# Patient Record
Sex: MALE | Race: BLACK OR AFRICAN AMERICAN | NOT HISPANIC OR LATINO | URBAN - METROPOLITAN AREA
[De-identification: names, ages, dates, MRNs, and addresses within clinical notes are randomized per-mention and may not be internally consistent; named-entity substitution may affect disease eponyms.]

---

## 2021-07-12 ENCOUNTER — OFFICE VISIT (OUTPATIENT)
Dept: FAMILY MEDICINE | Facility: CLINIC | Age: 66
End: 2021-07-12
Payer: COMMERCIAL

## 2021-07-12 ENCOUNTER — APPOINTMENT (OUTPATIENT)
Dept: INTERPRETER SERVICES | Facility: CLINIC | Age: 66
End: 2021-07-12
Payer: COMMERCIAL

## 2021-07-12 VITALS
WEIGHT: 174.6 LBS | TEMPERATURE: 97.8 F | OXYGEN SATURATION: 98 % | HEIGHT: 68 IN | HEART RATE: 60 BPM | RESPIRATION RATE: 14 BRPM | SYSTOLIC BLOOD PRESSURE: 132 MMHG | DIASTOLIC BLOOD PRESSURE: 68 MMHG | BODY MASS INDEX: 26.46 KG/M2

## 2021-07-12 DIAGNOSIS — R05.9 COUGH: ICD-10-CM

## 2021-07-12 DIAGNOSIS — B02.9 HERPES ZOSTER WITHOUT COMPLICATION: Primary | ICD-10-CM

## 2021-07-12 PROCEDURE — 36415 COLL VENOUS BLD VENIPUNCTURE: CPT | Performed by: PHYSICIAN ASSISTANT

## 2021-07-12 PROCEDURE — 86787 VARICELLA-ZOSTER ANTIBODY: CPT | Performed by: PHYSICIAN ASSISTANT

## 2021-07-12 PROCEDURE — 99203 OFFICE O/P NEW LOW 30 MIN: CPT | Performed by: PHYSICIAN ASSISTANT

## 2021-07-12 RX ORDER — VALACYCLOVIR HYDROCHLORIDE 1 G/1
1000 TABLET, FILM COATED ORAL 3 TIMES DAILY
Qty: 21 TABLET | Refills: 0 | Status: SHIPPED | OUTPATIENT
Start: 2021-07-12 | End: 2022-10-04

## 2021-07-12 RX ORDER — FLUTICASONE PROPIONATE 50 MCG
1 SPRAY, SUSPENSION (ML) NASAL DAILY
Qty: 18.2 ML | Refills: 0 | Status: SHIPPED | OUTPATIENT
Start: 2021-07-12 | End: 2022-10-04

## 2021-07-12 ASSESSMENT — MIFFLIN-ST. JEOR: SCORE: 1543.86

## 2021-07-12 NOTE — PATIENT INSTRUCTIONS
Yenny Morrison,    Thank you for allowing [facility] to manage your care.    Your exam is consistent with shingles/Zoster. If you develop worsening/changing symptoms at any time, please call 911 or go to the emergency department for evaluation.      I ordered some lab work, please go to the laboratory to get your studies.    I sent your prescriptions to your pharmacy.    For your pain, please use Ibuprofen 400mg four times daily with food. Between ibuprofen doses, you may use Tylenol 650mg.     Max acetaminophen (Tylenol) 3,000mg/24 hours  Max ibuprofen 2,400mg/24 hours    Drink 8-10 glasses of fluid daily to stay well-hydrated.    Please allow 1-2 business days for our office to contact you in regards to your laboratory/radiological studies.  If not done so, I encourage you to login into [employer] ([email or web address] to review your results as well.     If you have any questions or concerns, please feel free to call us at [phone]    Sincerely,    Hai Erazo PA-C    Did you know?      You can schedule a video visit for follow-up appointments as well as future appointments for certain conditions.  Please see the below link.     [email or web address]    If you have not already done so,  I encourage you to sign up for [employer] ([email or web address]).  This will allow you to review your results, securely communicate with a provider, and schedule virtual visits as well.      Patient Education     Shingles  Shingles is a viral infection caused by the same virus that causes chicken pox. Anyone who has had chicken pox may get shingles later in life. The virus stays in the body, but remains asleep (dormant). Shingles often occurs in older persons or persons with lowered immunity. But it can affect anyone at any age.  Shingles starts as a tingling patch of skin on one side of the body. Small, painful blisters may then appear. The rash rarely  spreads to other parts of the body.  Exposure to shingles can't cause shingles. However, it can cause chicken pox in anyone who has not had chicken pox or has not been vaccinated. The contagious period ends when all blisters have crusted over, generally 1 to 2 weeks after the illness starts.  After the blisters heal, the affected skin may be sensitive or painful for weeks or months, gradually resolving over time. But, sometimes this can last longer and be permanent (called postherpetic neuralgia.)  Shingles vaccines are available. Vaccination can help prevent shingles or make it less painful. It is generally recommended for adults older than 50, even if you've had singles in the past. Talk with your healthcare provider about when to get vaccinated and which vaccine is best for you.  Home care    Medicines may be prescribed to help relieve pain. Take these medicines as directed. Ask your healthcare provider or pharmacist before using over-the-counter medicines for helping treat pain and itching.    In certain cases, antiviral medicines may be prescribed to reduce pain, shorten the illness, and prevent neuralgia. Take these medicines as directed.    Compresses made from a solution of cool water mixed with cornstarch or baking soda may help relieve pain and itching.     Gently wash skin daily with soap and water to help prevent infection. Be certain to rinse off all of the soap, which can be irritating.    Trim fingernails and try not to scratch. Scratching the sores may leave scars.    Stay home from work or school until all blisters have formed a crust and you are no longer contagious.  Follow-up care  Follow up with your healthcare provider, or as directed.  When to seek medical advice    Fever of 100.4 F (38 C) or higher, or as directed by your healthcare provider    Affected skin is on the face or neck and any of the following occur:  ? Headache  ? Eye pain  ? Changes in vision  ? Sores near the eye  ? Weakness of  facial muscles    Blisters occurring on new areas of the body    Pain, redness, or swelling of a joint    Signs of skin infection: colored drainage from the sores, warmth, increasing redness, fever, or increasing pain  Eben last reviewed this educational content on 4/1/2018 2000-2021 [employer]. All rights reserved. This information is not intended as a substitute for professional medical care. Always follow your healthcare professional's instructions.

## 2021-07-12 NOTE — LETTER
[facility] NO[facility]  [address] [place] [address]  [phone]        July 13, 2021      Prince Sheehan  [id number] [place] [address]        Dear ,    We are writing to inform you of your test results.    Your results show that you had chickenpox or Varicella Zoster Virus previously, making it likely that your current rash is due to Zoster or shingles. Please continue with our plan of care.    Resulted Orders   Varicella Zoster Virus Antibody IgG   Result Value Ref Range    VZV [place] IgG Instrument Value 2,235.0 (H) <135.0 Index    Varicella Zoster Antibody IgG Positive (A) No detectable antibody.       Comment:      Suggests previous exposure or immunization and probable immunity.       If you have any questions or concerns, please call the clinic at the number listed above.       Sincerely,      EUGENIO Granados/johnyo

## 2021-07-12 NOTE — PROGRESS NOTES
Assessment & Plan   Problem List Items Addressed This Visit     None      Visit Diagnoses     Herpes zoster without complication    -  Primary    Relevant Medications    valACYclovir (VALTREX) 1000 mg tablet    Other Relevant Orders    Varicella Zoster Virus Antibody IgG (Completed)    Cough        Relevant Medications    fluticasone (FLONASE) 50 MCG/ACT nasal spray         It is my impression based on the historical events and the physical exam that this is zoster, though he denies primary VZV infection. He is interested in serology, which does show Zoster. I do not believe the patient has concurrent, cellulitis, abscess, or necrotizing fasciitis.  Appears well and non-toxic and I have low suspicion for disseminated illness. Will treat with valacyclovir course. Follow up in 7-10 days as needed.    Complete history and physical exam as below. AF with normal VS.    DDx and Dx discussed with and explained to the pt to their satisfaction.  All questions were answered at this time. Pt expressed understanding of and agreement with this dx, tx, and plan. No further workup warranted and standard medication warnings given. I have given the patient a list of pertinent indications for re-evaluation. Will go to the Emergency Department if symptoms worsen or new concerning symptoms arise. Patient left in no apparent distress.     31 minutes spent on the date of the encounter doing chart review, history and exam, documentation and further activities per the note     See Patient Instructions    Return in about 2 weeks (around 7/26/2021) for a recheck of your symptoms if not improving, or call 911/go to an ER anytime if worsening.    EUGENIO Granados  [facility] NOAH Morrison is a 65 year old who presents for the following health issues     HPI   Professional [employer]  on ipad used.  Rash  Onset/Duration: 7-8 days  Description  Location: in back of the right leg, from the knee to  "groin  Character: pimples, red  Itching: moderate  Intensity:  moderate  Progression of Symptoms:  same  Accompanying signs and symptoms:   Fever: no  Body aches or joint pain: no  Sore throat symptoms: YES- once in a while, comes and goes, bloody spit, dryness of throat  Recent cold symptoms: no  History:           Previous episodes of similar rash: None  New exposures:  None  Recent travel: no  Exposure to similar rash: no  Precipitating or alleviating factors: None  Therapies tried and outcome: Vaseline, OTC pain medication    Concern - Swelling  Onset: 7-8 days  Description: right leg  Intensity: moderate  Progression of Symptoms:  same  Accompanying Signs & Symptoms: painful, tingling kind of itching  Previous history of similar problem: No  Precipitating factors:        Worsened by: No  Alleviating factors:        Improved by: No  Therapies tried and outcome: Vaseline, OTC pain medication    Review of Systems   Constitutional, derm systems are negative, except as otherwise noted.      Objective    /68   Pulse 60   Temp 97.8  F (36.6  C) (Tympanic)   Resp 14   Ht 1.715 m (5' 7.52\")   Wt 79.2 kg (174 lb 9.6 oz)   SpO2 98%   BMI 26.93 kg/m    Body mass index is 26.93 kg/m .  Physical Exam  Vitals and nursing note reviewed.   Constitutional:       General: He is not in acute distress.     Appearance: Normal appearance. He is not diaphoretic.   HENT:      Head: Normocephalic and atraumatic.      Nose: Nose normal.   Eyes:      Conjunctiva/sclera: Conjunctivae normal.   Pulmonary:      Effort: Pulmonary effort is normal. No respiratory distress.   Skin:     General: Skin is dry.      Coloration: Skin is not jaundiced or pale.      Comments: RLE: erythematous vesiculopapular rash to right anterolateral buttock and thigh.   Neurological:      General: No focal deficit present.      Mental Status: He is alert. Mental status is at baseline.   Psychiatric:         Mood and Affect: Mood normal.         " Behavior: Behavior normal.          Results for orders placed or performed in visit on 07/12/21   Varicella Zoster Virus Antibody IgG     Status: Abnormal   Result Value Ref Range    VZV [place] IgG Instrument Value 2,235.0 (H) <135.0 Index    Varicella Zoster Antibody IgG Positive (A) No detectable antibody.

## 2021-07-13 LAB
VZV IGG SER QL IA: 2235 INDEX
VZV IGG SER QL IA: POSITIVE

## 2021-10-13 ENCOUNTER — VIRTUAL VISIT (OUTPATIENT)
Dept: FAMILY MEDICINE | Facility: CLINIC | Age: 66
End: 2021-10-13
Payer: COMMERCIAL

## 2021-10-13 ENCOUNTER — TELEPHONE (OUTPATIENT)
Dept: FAMILY MEDICINE | Facility: CLINIC | Age: 66
End: 2021-10-13

## 2021-10-13 DIAGNOSIS — J40 BRONCHITIS: Primary | ICD-10-CM

## 2021-10-13 DIAGNOSIS — J02.9 SORETHROAT: ICD-10-CM

## 2021-10-13 PROCEDURE — 99213 OFFICE O/P EST LOW 20 MIN: CPT | Mod: 95 | Performed by: INTERNAL MEDICINE

## 2021-10-13 RX ORDER — BENZONATATE 100 MG/1
100 CAPSULE ORAL 3 TIMES DAILY PRN
Qty: 21 CAPSULE | Refills: 0 | Status: SHIPPED | OUTPATIENT
Start: 2021-10-13 | End: 2022-10-04

## 2021-10-13 RX ORDER — GUAIFENESIN/DEXTROMETHORPHAN 100-10MG/5
10 SYRUP ORAL EVERY 4 HOURS PRN
Qty: 236 ML | Refills: 0 | Status: SHIPPED | OUTPATIENT
Start: 2021-10-13 | End: 2022-10-04

## 2021-10-13 NOTE — TELEPHONE ENCOUNTER
Patient's sister in law returning call for patient's appointment today.   She wants to interpret for patient and wants to be called at the number above.     Jennifer VNETURAN, RN

## 2021-10-13 NOTE — PATIENT INSTRUCTIONS
Patient Education     Bronchitis, Antibiotic Treatment (Adult)    Bronchitis is an infection of the air passages (bronchial tubes) in your lungs. It often occurs when you have a cold. This illness is contagious during the first few days and is spread through the air by coughing and sneezing, or by direct contact (touching the sick person and then touching your own eyes, nose, or mouth).  Symptoms of bronchitis include cough with mucus (phlegm) and low-grade fever. Bronchitis usually lasts 7 to 14 days. Mild cases can be treated with simple home remedies. More severe infection is treated with an antibiotic.  Home care  Follow these guidelines when caring for yourself at home:    If your symptoms are severe, rest at home for the first 2 to 3 days. When you go back to your usual activities, don't let yourself get too tired.    Don't smoke. Also stay away from secondhand smoke.    You may use over-the-counter medicines to control fever or pain, unless another medicine was prescribed. If you have chronic liver or kidney disease or have ever had a stomach ulcer or gastrointestinal bleeding, talk with your healthcare provider before using these medicines. Also talk to your provider if you are taking medicine to prevent blood clots. Aspirin should never be given to anyone younger than 18 who is ill with a viral infection or fever. It may cause severe liver or brain damage.    Your appetite may be low, so a light diet is fine. Stay well hydrated by drinking 6 to 8 glasses of fluids per day. This includes water, soft drinks, sports drinks, juices, tea, or soup. Extra fluids will help loosen mucus in your nose and lungs.    Over-the-counter cough, cold, and sore-throat medicines will not shorten the length of the illness, but they may be helpful to reduce your symptoms. Don't use decongestants if you have high blood pressure.    Finish all antibiotic medicine. Do this even if you are feeling better after only a few  days.  Follow-up care  Follow up with your healthcare provider, or as advised. If you had an X-ray or ECG (electrocardiogram), a specialist will review it. You will be told of any new test results that may affect your care.  If you are age 65 or older, if you smoke, or if you have a chronic lung disease or condition that affects your immune system, ask your healthcare provider about getting a pneumococcal vaccine and a yearly flu shot (influenza vaccine).  When to seek medical advice  Call your healthcare provider right away if any of these occur:    Fever of 100.4 F (38 C) or higher, or as directed by your healthcare provider    Coughing up more sputum    Weakness, drowsiness, headache, facial pain, ear pain, or a stiff neck  Call 911  Call 911 if any of these occur.    Coughing up blood    Weakness, drowsiness, headache, or stiff neck that get worse    Trouble breathing, wheezing, or pain with breathing  Eben last reviewed this educational content on 6/1/2018 2000-2021 [employer]. All rights reserved. This information is not intended as a substitute for professional medical care. Always follow your healthcare professional's instructions.           Patient Education     When You Have a Sore Throat  A sore throat can be painful. There are many reasons why you may have a sore throat. Your healthcare provider will work with you to find the cause of your sore throat. He or she will also find the best treatment for you.     What causes a sore throat?  Sore throats can be caused or worsened by:     Cold or flu viruses    Bacteria    Irritants such as tobacco smoke or air pollution    Acid reflux  A healthy throat  The tonsils are on the sides of the throat near the base of the tongue. They collect viruses and bacteria and help fight infection. The throat (pharynx) is the passage for air. Mucus from the nasal cavity also moves down the passage.   An inflamed throat  The tonsils and pharynx can become  inflamed due to a cold or flu virus. Postnasal drip (excess mucus draining from the nasal cavity) can irritate the throat. It can also make the throat or tonsils more likely to be infected by bacteria. Severe, untreated tonsillitis in children or adults can cause a pocket of pus (abscess) to form near the tonsil.   Your evaluation  A health evaluation can help find the cause of your sore throat. It can also help your healthcare provider choose the best treatment for you. The evaluation may include a health history, physical exam, and diagnostic tests.   Health history  Your healthcare provider may ask you the following:     How long has the sore throat lasted and how have you been treating it?    Do you have any other symptoms, such as body aches, fever, or cough?    Does your sore throat recur? If so, how often? How many days of school or work have you missed because of a sore throat?    Do you have trouble eating or swallowing?    Have you been told that you snore or have other sleep problems?    Do you have bad breath?    Do you cough up bad-tasting mucus?  Physical exam  During the exam, your healthcare provider checks your ears, nose, and throat for problems. He or she also checks for swelling in the neck, and may listen to your chest.   Possible tests  Other tests your healthcare provider may perform include:     A throat swab to check for bacteria such as streptococcus (the bacteria that causes strep throat)    A blood test to check for mononucleosis (a viral infection)    A chest X-ray to rule out pneumonia, especially if you have a cough  Treating a sore throat  Treatment depends on many factors. What is the likely cause? Is the problem recent? Does it keep coming back? In many cases, the best thing to do is to treat the symptoms, rest, and let the problem heal itself. Antibiotics may help clear up some bacterial infections. For cases of severe or recurring tonsillitis, the tonsils may need to be removed.    Relieving your symptoms    Don t smoke, and stay away from secondhand smoke.    For children, try throat sprays or frozen ice pops. Adults and older children may try lozenges.    Drink warm liquids to soothe the throat and help thin mucus. Stay away from alcohol, spicy foods, and acidic drinks such as orange juice. These can irritate the throat.    Gargle with warm saltwater ( 1 teaspoon of salt to  8 ounces of warm water).    Use a humidifier to keep air moist and relieve throat dryness.    Try over-the-counter pain relievers such as acetaminophen or ibuprofen. Use as directed, and don t exceed the recommended dose. Don t give aspirin to children under age17.    Are antibiotics needed?  If your sore throat is due to a bacterial infection, antibiotics may speed healing and prevent complications. Although group A streptococcus (strep throat) is the major treatable infection for a sore throat, strep throat causes only 5% to 15% of sore throats in adults who seek medical care. Most sore throats are caused by cold or flu viruses. And antibiotics don t treat viral illness. In fact, using antibiotics when they re not needed may lead to bacteria that are harder to kill. Your healthcare provider will prescribe antibiotics only if he or she thinks they are likely to help.   If antibiotics are prescribed  Take the medicine exactly as directed. Be sure to finish your prescription even if you re feeling better. Ask your healthcare provider or pharmacist what side effects are common and what to do about them.   Is surgery needed?  In some cases, tonsils need to be removed. This is often done as outpatient (same-day) surgery. Your healthcare provider may advise removing the tonsils in cases of:     Several severe bouts of tonsillitis in a year.  Severe  episodes include those that lead to missed days of school or work, or that need to be treated with antibiotics.    Tonsillitis that causes breathing problems during  sleep    Tonsillitis caused by food particles collecting in pouches in the tonsils (cryptic tonsillitis)  When to call your healthcare provider   Call your healthcare provider immediately if any of the following occur:     Problems swallowing    Symptoms worsen, or new symptoms develop.    Swollen tonsils make breathing difficult.    The pain is severe enough to keep you from drinking liquids.    If a skin rash or hives, develops, call your healthcare provider immediately. Any of these could signal an allergic reaction to antibiotics.    Symptoms don t improve within a week.    Symptoms don t improve within  2 to 3  days of starting antibiotics.  Call 911  Call 911 if any of the following occur:     Trouble breathing or problems catching your breath may be a medical emergency.    Skin is blue, purple or gray in color    Trouble talking    Feeling dizzy or faint    Feeling of doom  Eben last reviewed this educational content on 7/1/2019 2000-2021 [employer]. All rights reserved. This information is not intended as a substitute for professional medical care. Always follow your healthcare professional's instructions.

## 2021-10-13 NOTE — PROGRESS NOTES
"Prince is a 65 year old who is being evaluated via a billable telephone visit.      What phone number would you like to be contacted at? [phone]  How would you like to obtain your AVS? Mail a copy    Assessment & Plan     Bronchitis  Started off after having a sore throat  Initially started as sore throat  Cough is mainly dry but started getting some yellow sputum in the last couple of days  He coughs a lot when the throat becomes dry  No fever  No body aches  - amoxicillin-clavulanate (AUGMENTIN) 875-125 MG tablet; Take 1 tablet by mouth 2 times daily  - guaiFENesin-dextromethorphan (ROBITUSSIN DM) 100-10 MG/5ML syrup; Take 10 mLs by mouth every 4 hours as needed for cough  - benzonatate (TESSALON) 100 MG capsule; Take 1 capsule (100 mg) by mouth 3 times daily as needed for cough    Sorethroat  Initially started having sore throat a week ago  Sore throat is still persisting  Hurts when he swallows  Glands are swollen  No rhinorrhea  - Symptomatic COVID-19 Virus (Coronavirus) by PCR Nose; Future      20 minutes spent on the date of the encounter doing chart review, history and exam, documentation and further activities per the note       BMI:   Estimated body mass index is 26.93 kg/m  as calculated from the following:    Height as of 7/12/21: 1.715 m (5' 7.52\").    Weight as of 7/12/21: 79.2 kg (174 lb 9.6 oz).           Return in about 1 week (around 10/20/2021), or if symptoms worsen or fail to improve.    Jez Cruz MD  [facility]   Prince is a 65 year old who presents for the following health issues     HPI   Spoke to patient with the help of   One of the relatives acted as   He started having sore throat a week ago  Throat is still sore  Hurts when he swallows  Now having dry cough mainly but in the last 2 days producing some yellow sputum  No shortness of breath  No myalgias  Fully vaccinated against Covid          Review of Systems "   Constitutional, HEENT, cardiovascular, pulmonary, gi and gu systems are negative, except as otherwise noted.      Objective           Vitals:  No vitals were obtained today due to virtual visit.    Physical Exam   healthy, alert and no distress  PSYCH: Alert and oriented times 3; coherent speech, normal   rate and volume, able to articulate logical thoughts, able   to abstract reason, no tangential thoughts, no hallucinations   or delusions  His affect is normal  RESP: Having cough  Remainder of exam unable to be completed due to telephone visits                Phone call duration: 12 minutes

## 2021-10-14 ENCOUNTER — APPOINTMENT (OUTPATIENT)
Dept: INTERPRETER SERVICES | Facility: CLINIC | Age: 66
End: 2021-10-14
Payer: COMMERCIAL

## 2022-02-14 ENCOUNTER — APPOINTMENT (OUTPATIENT)
Dept: INTERPRETER SERVICES | Facility: CLINIC | Age: 67
End: 2022-02-14
Payer: COMMERCIAL

## 2022-10-03 ENCOUNTER — TELEPHONE (OUTPATIENT)
Dept: FAMILY MEDICINE | Facility: CLINIC | Age: 67
End: 2022-10-03

## 2022-10-03 ENCOUNTER — NURSE TRIAGE (OUTPATIENT)
Dept: FAMILY MEDICINE | Facility: CLINIC | Age: 67
End: 2022-10-03

## 2022-10-03 NOTE — TELEPHONE ENCOUNTER
I had spoken to this patient and his relative earlier today, see triage encounter.   Their plan was to go to [facility] as we had not openings and patient not established with us.    Requires [employer] .    I called   Services, placed call to patient with assistance of [facility] .    Sounds like there's more than one person on the phone.    I don't find a consent to communicate on file.    Spoke to patient with assistance of .    Patient did go to urgent care per his report but says he was told to get seen at his clinic as the  did not have x-ray and he was told he needed an x-ray.    Female in the background says they were told there were no openings until January.   I advised I could look but did not think I could get him in for at least a few days, he is not established with us so can't use approval req spot.    Female says they called and [facility] has X-ray so patient plans to go there after work, he is heading for work now.    I advised another option is to call early in the AM to try to get a same day visit with someone.    Patient verbalized understanding of and agreement with plan.    Rosa Owens, RN  [facility]

## 2022-10-03 NOTE — TELEPHONE ENCOUNTER
Reason for Call:  Other appointment    Detailed comments: Patient is coughing up blood since yesterday and would like to be seen in [place] clinic tomorrow.    Phone Number Patient can be reached at: Cell number on file:    Telephone Information:   Mobile [phone]       Best Time: Anytime    Can we leave a detailed message on this number? YES    Call taken on 10/3/2022 at 2:25 PM by Renee Arellano

## 2022-10-03 NOTE — TELEPHONE ENCOUNTER
Relative calling, says Prince is with him    Aubree is driving with Prince to come to the [facility] as patient is seeing blood in sputum starting last night.   Ejmi interpreting for patient, see triage below.    GO TO OFFICE NOW:  * You need to be examined. Come into the office right now.  No openings at [place], patient is not established here, last visit was in July 2021 with same day provider.  * IF NO AVAILABLE APPOINTMENTS:  You need to be seen in an Urgent Care Center. Go to the one at [facility]. Leave now. A nearby Urgent Care Center is often a good source of care. Another choice is to go to the Emergency Department.  Patient and relative verbalized understanding and agreement.    Rosa Owens RN  [facility]    Reason for Disposition    Coughed up blood and > 1 tablespoon (15 ml)  (Exception: Blood-tinged sputum.)    Additional Information    Negative: SEVERE difficulty breathing (e.g., struggling for each breath, speaks in single words)    Negative: Chest pain and difficulty breathing    Negative: Bluish (or gray) lips or face now    Negative: Passed out (i.e., lost consciousness, collapsed and was not responding)    Negative: Shock suspected (e.g., cold/pale/clammy skin, too weak to stand, low BP, rapid pulse)    Negative: Difficult to awaken or acting confused (e.g., disoriented, slurred speech)    Negative: Recent chest injury (i.e., past 24 hours)    Negative: Coughed up blood and large amount (Such as: 'a half cup of blood')    Negative: Sounds like a life-threatening emergency to the triager    Negative: MODERATE difficulty breathing (e.g., speaks in phrases, SOB even at rest, pulse 100-120) and still present when not coughing    Negative: Chest pain    Negative: Unclear to triager if the patient is coughing up blood or vomiting blood    Negative: History of prior 'blood clot' in leg or lungs (i.e., deep vein thrombosis, pulmonary embolism)    Negative:  "History of inherited increased risk of blood clots (e.g., Factor 5 Leiden, Anti-thrombin 3, Protein C or Protein S deficiency, Prothrombin mutation)    Negative: Pregnant or postpartum (from 0 to 6 weeks after delivery)    Negative: Hip or leg fracture (broken bone) in past month (or had cast on leg or ankle in past month)    Negative: Long-distance travel in past month (e.g., car, bus, train, plane; with trip lasting 6 or more hours)    Negative: Bedridden (e.g., nursing home patient, CVA, chronic illness, recovering from surgery)    Negative: Patient sounds very sick or weak to the triager    Negative: MILD difficulty breathing (e.g., minimal/no SOB at rest, SOB with walking, pulse <100) and still present when not coughing (Exception: No change from usual, chronic shortness of breath.)    Answer Assessment - Initial Assessment Questions  1. ONSET: \"When did the cough begin?\"       Has mild chronic cough, worse yesterday  2. SEVERITY: \"How bad is the cough today?\" \"Did the blood appear after a coughing spell?\"       Worse today, says needs to cough every 5-6 minutes  3. SPUTUM: \"Describe the color of your sputum\" (none, dry cough; clear, white, yellow, green)      clear  4. HEMOPTYSIS: \"How much blood?\" (flecks, streaks, tablespoons, etc.)      Starting last night, notes dark brown blood, reports spoonful amount of dark blood   5. DIFFICULTY BREATHING: \"Are you having difficulty breathing?\" If Yes, ask: \"How bad is it?\" (e.g., mild, moderate, severe)     - MILD: No SOB at rest, mild SOB with walking, speaks normally in sentences, can lie down, no retractions, pulse < 100.     - MODERATE: SOB at rest, SOB with minimal exertion and prefers to sit, cannot lie down flat, speaks in phrases, mild retractions, audible wheezing, pulse 100-120.     - SEVERE: Very SOB at rest, speaks in single words, struggling to breathe, sitting hunched forward, retractions, pulse > 120       mild  6. FEVER: \"Do you have a fever?\" If Yes, " "ask: \"What is your temperature, how was it measured, and when did it start?\"      no  7. CARDIAC HISTORY: \"Do you have any history of heart disease?\" (e.g., heart attack, congestive heart failure)       no  8. LUNG HISTORY: \"Do you have any history of lung disease?\"  (e.g., pulmonary embolus, asthma, emphysema)      no  9. PE RISK FACTORS: \"Do you have a history of blood clots?\" (or: recent major surgery, recent prolonged travel, bedridden)      no  10. OTHER SYMPTOMS: \"Do you have any other symptoms?\" (e.g., runny nose, wheezing, chest pain)        no  11. PREGNANCY: \"Is there any chance you are pregnant?\" \"When was your last menstrual period?\"        n/a  12. TRAVEL: \"Have you traveled out of the country in the last month?\" (e.g., travel history, exposures)        no    Protocols used: COUGHING UP BLOOD-A-OH      "

## 2022-10-04 ENCOUNTER — ANCILLARY PROCEDURE (OUTPATIENT)
Dept: GENERAL RADIOLOGY | Facility: CLINIC | Age: 67
End: 2022-10-04
Attending: INTERNAL MEDICINE
Payer: COMMERCIAL

## 2022-10-04 ENCOUNTER — OFFICE VISIT (OUTPATIENT)
Dept: URGENT CARE | Facility: URGENT CARE | Age: 67
End: 2022-10-04

## 2022-10-04 VITALS
HEART RATE: 54 BPM | OXYGEN SATURATION: 100 % | SYSTOLIC BLOOD PRESSURE: 143 MMHG | DIASTOLIC BLOOD PRESSURE: 66 MMHG | WEIGHT: 163.4 LBS | BODY MASS INDEX: 25.2 KG/M2 | TEMPERATURE: 96.8 F

## 2022-10-04 DIAGNOSIS — R04.2 BLOOD IN SPUTUM: Primary | ICD-10-CM

## 2022-10-04 DIAGNOSIS — R04.2 BLOOD IN SPUTUM: ICD-10-CM

## 2022-10-04 LAB
BASOPHILS # BLD AUTO: 0 10E3/UL (ref 0–0.2)
BASOPHILS NFR BLD AUTO: 0 %
EOSINOPHIL # BLD AUTO: 0 10E3/UL (ref 0–0.7)
EOSINOPHIL NFR BLD AUTO: 1 %
ERYTHROCYTE [DISTWIDTH] IN BLOOD BY AUTOMATED COUNT: 12.1 % (ref 10–15)
HCT VFR BLD AUTO: 43.7 % (ref 40–53)
HGB BLD-MCNC: 14.8 G/DL (ref 13.3–17.7)
LYMPHOCYTES # BLD AUTO: 1.8 10E3/UL (ref 0.8–5.3)
LYMPHOCYTES NFR BLD AUTO: 44 %
MCH RBC QN AUTO: 31.3 PG (ref 26.5–33)
MCHC RBC AUTO-ENTMCNC: 33.9 G/DL (ref 31.5–36.5)
MCV RBC AUTO: 92 FL (ref 78–100)
MONOCYTES # BLD AUTO: 0.5 10E3/UL (ref 0–1.3)
MONOCYTES NFR BLD AUTO: 13 %
NEUTROPHILS # BLD AUTO: 1.7 10E3/UL (ref 1.6–8.3)
NEUTROPHILS NFR BLD AUTO: 43 %
PLATELET # BLD AUTO: 243 10E3/UL (ref 150–450)
RBC # BLD AUTO: 4.73 10E6/UL (ref 4.4–5.9)
WBC # BLD AUTO: 4.1 10E3/UL (ref 4–11)

## 2022-10-04 PROCEDURE — 86481 TB AG RESPONSE T-CELL SUSP: CPT | Performed by: INTERNAL MEDICINE

## 2022-10-04 PROCEDURE — 71046 X-RAY EXAM CHEST 2 VIEWS: CPT | Mod: TC | Performed by: RADIOLOGY

## 2022-10-04 PROCEDURE — 36415 COLL VENOUS BLD VENIPUNCTURE: CPT | Performed by: INTERNAL MEDICINE

## 2022-10-04 PROCEDURE — 85025 COMPLETE CBC W/AUTO DIFF WBC: CPT | Performed by: INTERNAL MEDICINE

## 2022-10-04 PROCEDURE — 99214 OFFICE O/P EST MOD 30 MIN: CPT | Performed by: INTERNAL MEDICINE

## 2022-10-04 PROCEDURE — 80053 COMPREHEN METABOLIC PANEL: CPT | Performed by: INTERNAL MEDICINE

## 2022-10-04 NOTE — PROGRESS NOTES
SUBJECTIVE:  Prince Sheehan is an 66 year old male who presents for sometimes has blood come up when clears throat, in the day is mixed with sputum; at night has had just blood come up.  Not coughing, more of throat clearing.  He denies having a cough.  No stomach pain. No heartburn or reflux sxs.  No fevers, chills, sweats.  No shortness of breath.  No chest pain.  No runny nose.  No sore throat.  No recent travel.  Pt here with family member who provides some hx and interprets.    PMH:  neg  There is no problem list on file for this patient.    Social History     Socioeconomic History     Marital status: Single     Spouse name: None     Number of children: None     Years of education: None     Highest education level: None   Tobacco Use     Smoking status: Never Smoker     Smokeless tobacco: Never Used   Substance and Sexual Activity     Alcohol use: Not Currently     Drug use: Not Currently     Sexual activity: Not Currently     Family History   Problem Relation Age of Onset     No Known Problems Mother      No Known Problems Father      No Known Problems Brother      No Known Problems Sister        ALLERGIES:  Patient has no known allergies.    No current outpatient medications on file.     No current facility-administered medications for this visit.         ROS:  ROS is done and is negative for general/constitutional, eye, ENT, Respiratory, cardiovascular, GI, , Skin, musculoskeletal except as noted elsewhere.  All other review of systems negative except as noted elsewhere.      OBJECTIVE:  BP (!) 143/66   Pulse 54   Temp 96.8  F (36  C) (Tympanic)   Wt 74.1 kg (163 lb 6.4 oz)   SpO2 100%   BMI 25.20 kg/m    GENERAL APPEARANCE: Alert, in no acute distress  EYES: normal  EARS: External ears normal. Canals clear. TM's normal.  NOSE:normal  OROPHARYNX:normal  NECK:No adenopathy,masses or thyromegaly  RESP: normal and clear to auscultation  CV:regular rate and rhythm and no murmurs, clicks, or  gallops  ABDOMEN: Abdomen soft, non-tender. BS normal. No masses, organomegaly  SKIN: no ulcers, lesions or rash  MUSCULOSKELETAL:Musculoskeletal normal      RESULTS  Results for orders placed or performed in visit on 10/04/22   XR Chest 2 Views     Status: None    Narrative    CHEST TWO VIEWS  10/4/2022 1:19 PM     HISTORY: Blood in sputum.    COMPARISON: Chest x-ray on 9/9/2016.      Impression    IMPRESSION: PA and lateral views of the chest were obtained.  Cardiomediastinal silhouette is within normal limits. Atherosclerotic  vascular calcification of the aortic knob. Lateral left basilar  pulmonary opacity, could represent atelectasis versus infection. No  significant pleural effusion or pneumothorax.    JOVANNY CHO MD         SYSTEM ID:  [id number]   Results for orders placed or performed in visit on 10/04/22   Quantiferon TB Gold Plus     Status: None (In process)    Specimen: Peripheral Blood    Narrative    The following orders were created for panel order Quantiferon TB Gold Plus.  Procedure                               Abnormality         Status                     ---------                               -----------         ------                     Quantiferon TB Gold Plus...[[id number]]                      In process                 Quantiferon TB Gold Plus...[[id number]]                      In process                 Quantiferon TB Gold Plus...[[id number]]                      In process                 Quantiferon TB Gold Plus...[[id number]]                      In process                   Please view results for these tests on the individual orders.   CBC with platelets and differential     Status: None (In process)    Narrative    The following orders were created for panel order CBC with platelets and differential.  Procedure                               Abnormality         Status                     ---------                               -----------         ------                     CBC  with platelets and d...[[id number]]                      In process                   Please view results for these tests on the individual orders.   .      ASSESSMENT/PLAN:    ASSESSMENT / PLAN:  (R04.2) Blood in sputum  (primary encounter diagnosis)  Comment: pt does not currently have any other sxs such as cough, sore throat, or GI sxs.  Possible etiologies include respiratory infection, inflammation or mass, esophageal or throat inflammation, infection, or growth, etc.  Xray with mild basilar opacity which may atelectasis as pt's current sxs are not c/w pneumonia.  Also noted on chart that pt was seen in a different urgent care and advised to go to ER , but no ER visit on record.  Plan: XR Chest 2 Views, Quantiferon TB Gold Plus, CBC        with platelets and differential, Comprehensive         metabolic panel (BMP + Alb, Alk Phos, ALT, AST,        Total. Bili, TP)        Will check TB quantiferon, cbc and metabolic panel.  We also scheduled pt for an appt next week with pcp for further ongoing evaluation as more work up or testing may be needed if labs do not indicate a cause.  I reviewed supportive care, otc meds to use if needed, expected course, and signs of concern.  Follow up with pcp within 1 week(s) or sooner if worsens in any way.  Reviewed red flag symptoms and is to go to the ER if experiences any of these.        See [facility] for orders, medications, letters, patient instructions    Yolette Cervantes M.D.

## 2022-10-05 LAB
ALBUMIN SERPL-MCNC: 3.7 G/DL (ref 3.4–5)
ALP SERPL-CCNC: 72 U/L (ref 40–150)
ALT SERPL W P-5'-P-CCNC: 20 U/L (ref 0–70)
ANION GAP SERPL CALCULATED.3IONS-SCNC: 1 MMOL/L (ref 3–14)
AST SERPL W P-5'-P-CCNC: 15 U/L (ref 0–45)
BILIRUB SERPL-MCNC: 0.4 MG/DL (ref 0.2–1.3)
BUN SERPL-MCNC: 15 MG/DL (ref 7–30)
CALCIUM SERPL-MCNC: 9 MG/DL (ref 8.5–10.1)
CHLORIDE BLD-SCNC: 109 MMOL/L (ref 94–109)
CO2 SERPL-SCNC: 32 MMOL/L (ref 20–32)
CREAT SERPL-MCNC: 1 MG/DL (ref 0.66–1.25)
GFR SERPL CREATININE-BSD FRML MDRD: 83 ML/MIN/1.73M2
GLUCOSE BLD-MCNC: 83 MG/DL (ref 70–99)
POTASSIUM BLD-SCNC: 4.6 MMOL/L (ref 3.4–5.3)
PROT SERPL-MCNC: 7.3 G/DL (ref 6.8–8.8)
SODIUM SERPL-SCNC: 142 MMOL/L (ref 133–144)

## 2022-10-06 ENCOUNTER — TELEPHONE (OUTPATIENT)
Dept: URGENT CARE | Facility: URGENT CARE | Age: 67
End: 2022-10-06

## 2022-10-06 ENCOUNTER — NURSE TRIAGE (OUTPATIENT)
Dept: NURSING | Facility: CLINIC | Age: 67
End: 2022-10-06

## 2022-10-06 ENCOUNTER — E-VISIT (OUTPATIENT)
Dept: FAMILY MEDICINE | Facility: CLINIC | Age: 67
End: 2022-10-06
Payer: COMMERCIAL

## 2022-10-06 DIAGNOSIS — Z53.9 ERRONEOUS ENCOUNTER--DISREGARD: Primary | ICD-10-CM

## 2022-10-06 LAB
GAMMA INTERFERON BACKGROUND BLD IA-ACNC: 1.13 IU/ML
M TB IFN-G BLD-IMP: POSITIVE
M TB IFN-G CD4+ BCKGRND COR BLD-ACNC: 8.87 IU/ML
MITOGEN IGNF BCKGRD COR BLD-ACNC: 1.35 IU/ML
MITOGEN IGNF BCKGRD COR BLD-ACNC: 1.37 IU/ML
QUANTIFERON MITOGEN: 10 IU/ML
QUANTIFERON NIL TUBE: 1.13 IU/ML
QUANTIFERON TB1 TUBE: 2.48 IU/ML
QUANTIFERON TB2 TUBE: 2.5

## 2022-10-06 NOTE — TELEPHONE ENCOUNTER
Sister in law, Kostas, called (verbal consent obtained) stating that Prince tested positive for TB today and is wondering what kind of isolation precautions she should take. I did open her chart to document the encounter.    Ashley Yang RN  [facility] Nurse Advisor   10/6/2022  3:29 PM

## 2022-10-06 NOTE — TELEPHONE ENCOUNTER
Discussed with the patient and patient's son the positive Quantiferon TB Gold lab test.  Informed the patient that they need to schedule an urgent care virtual visit with a provider through [facility] to discuss treatment and follow up.  Informed the patient and son that the patient will need to quarantine until thevirtual visit has been completed.  Advised they can contact the clinic at [phone] with any questions.  A [facility] message was also sent regarding this plan.  Emileemo  on the line the duration of the call.     ROLAND Alegria CNP

## 2022-10-07 ENCOUNTER — TELEPHONE (OUTPATIENT)
Dept: FAMILY MEDICINE | Facility: CLINIC | Age: 67
End: 2022-10-07

## 2022-10-07 ENCOUNTER — VIRTUAL VISIT (OUTPATIENT)
Dept: URGENT CARE | Facility: CLINIC | Age: 67
End: 2022-10-07
Payer: COMMERCIAL

## 2022-10-07 DIAGNOSIS — R76.12 POSITIVE QUANTIFERON-TB GOLD TEST: Primary | ICD-10-CM

## 2022-10-07 PROCEDURE — 99207 PR NO CHARGE LOS: CPT

## 2022-10-07 NOTE — PROGRESS NOTES
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 No fevers or chills or night sweats/fatigue and xray ok.   Plan: Infectious Disease Referral, rifampin (RIFADIN)        300 MG capsule, Hepatic function panel        Will ask ID for help and follow-up. Reveiwed risks and side effects of medication  Repeat lft's in 3 weeks - labs set-up. Expected course and warning signs reviewed. Call/email with questions/concerns.   Let us know asap if more cough/ fevers or chills/ night sweats/fatigue/etc.         Ford Morrison is a 66 year old presenting for the following health issues:  Follow-up +tb blood test and spitting up blood.   Patient seen in UC 10/4 with concerns of blood mixed in saliva.  No cough, fever, chills, night sweats or unexplained weight loss.  CXR done 10/4 with no apical lesions noted or pleural effusion.  No more blood with sputum and cough stopped.   From [place]. No previous treatment for TB or known contacts. No mantoux test.   Had [place] in past. [place].   Here with son - negative TB blood test. Non-smoker.   Family healthy. No tobacco or ALCOHOL. No liver issues.   No chief complaint on file.      History of Present Illness       Reason for visit:  Follw up    He eats 0-1 servings of fruits and vegetables daily.He consumes 0 sweetened beverage(s) daily.He exercises with enough effort to increase his heart rate 30 to 60 minutes per day.  He exercises with enough effort to increase his heart rate 3 or less days per week.   He is taking medications regularly.    Today's PHQ-9         PHQ-9 Total Score: 0    PHQ-9 Q9 Thoughts of better off dead/self-harm past 2 weeks :   Not at all    How difficult have these problems made it for you to do your work, take care of things at home, or get along with other people: Not difficult at all       ED/UC Followup:    Facility:   [place]   Date of visit: 10/4/2022  Reason for visit: blood In sputum  Current Status: improving      Review of Systems         Objective    There were no vitals taken for this visit.  There is no height or weight on file to calculate BMI.   BP (!) 143/73   Pulse 63   Temp 96.9  F (36.1  C) (Tympanic)   Resp 16   Wt 76.2 kg (168 lb)   SpO2 98%   BMI 25.91 kg/m     Physical Exam   GENERAL: healthy, alert and no distress  EYES: Eyes grossly normal to inspection, PERRL and conjunctivae and sclerae normal  HENT: ear canals and TM's normal, nose and mouth without ulcers or lesions  NECK: no adenopathy, no asymmetry, masses, or scars and thyroid normal to palpation  RESP: lungs clear to auscultation - no rales, rhonchi or wheezes  CV: regular rate and rhythm, normal S1 S2, no S3 or S4, no murmur, click or rub, no peripheral edema and peripheral pulses strong  ABDOMEN: soft, nontender, no hepatosplenomegaly, no masses and bowel sounds normal  MS: no gross musculoskeletal defects noted, no edema  SKIN: no suspicious lesions or rashes  PSYCH: mentation appears normal, affect normal/bright

## 2022-10-07 NOTE — TELEPHONE ENCOUNTER
Dr. Rizwana Teresa, provider with [facility] called to clinic today to send message to clarify situation with this patient for .    Patient had an urgent care visit with Dr. Cervantes on 10/4/22 for blood in sputum. No coughing. Had a POSITIVE Quantiferon-TB Gold test result but did have a NEGATIVE chest x-ray, resulting in latent TB diagnosis.    Patient was scheduled for a virtual urgent care visit for today with Dr. Teresa but Dr. Teresa does not feel the virtual visit is needed for today. Patient is already scheduled for a F2F visit at [facility] with Dr. Velazquez on Monday, October 10, at 4:30 pm.   provider who had reviewed lab results on patient, Garret Stewart, BLAYNE had advised for patient to schedule the urgent care virtual visit to discuss plan, Dr. Teresa feeling this is not necessary since patient is being seen in person on Monday. Patient should follow up with primary care.      Routing to Dr. Velazquez to update.      Ailyn Dang RN  [facility]-Primary Care

## 2022-10-07 NOTE — PROGRESS NOTES
CC: + quantiferon-TB 10/4/2022 negative CXR    Patient seen in  10/4 with concerns of blood mixed in saliva.  No cough, fever, chills, night sweats or unexplained weight loss.  CXR done 10/4 with no apical lesions noted or pleural effusion.    Scheduled to see PCP Monday 10/10 for further Follow-up.    Confusion yesterday over evisit sent by patient.  Was given [facility] appt today.    Son is very concerned about risk to his family (wife and small kids) living with patient.    1. Positive QuantiFERON-TB Gold test    Referred to PCP on Monday for further follow up.  Discussed with son that currently recent testing favors the dx of latent TB.    PCP may consider CT chest and sputum for AFB/ROBIN on Monday.  May consider INH treatment for latent TB.    No charge for [facility] today.    Rizwana Teresa MD  [facility]

## 2022-10-10 ENCOUNTER — OFFICE VISIT (OUTPATIENT)
Dept: FAMILY MEDICINE | Facility: CLINIC | Age: 67
End: 2022-10-10
Payer: COMMERCIAL

## 2022-10-10 VITALS
OXYGEN SATURATION: 98 % | SYSTOLIC BLOOD PRESSURE: 143 MMHG | DIASTOLIC BLOOD PRESSURE: 73 MMHG | BODY MASS INDEX: 25.91 KG/M2 | TEMPERATURE: 96.9 F | HEART RATE: 63 BPM | WEIGHT: 168 LBS | RESPIRATION RATE: 16 BRPM

## 2022-10-10 DIAGNOSIS — Z22.7 LATENT TUBERCULOSIS INFECTION: Primary | ICD-10-CM

## 2022-10-10 PROCEDURE — 99213 OFFICE O/P EST LOW 20 MIN: CPT | Performed by: FAMILY MEDICINE

## 2022-10-10 RX ORDER — RIFAMPIN 300 MG/1
600 CAPSULE ORAL DAILY
Qty: 60 CAPSULE | Refills: 2 | Status: SHIPPED | OUTPATIENT
Start: 2022-10-10 | End: 2022-12-09

## 2022-10-10 ASSESSMENT — PAIN SCALES - GENERAL: PAINLEVEL: NO PAIN (0)

## 2022-10-10 ASSESSMENT — PATIENT HEALTH QUESTIONNAIRE - PHQ9
10. IF YOU CHECKED OFF ANY PROBLEMS, HOW DIFFICULT HAVE THESE PROBLEMS MADE IT FOR YOU TO DO YOUR WORK, TAKE CARE OF THINGS AT HOME, OR GET ALONG WITH OTHER PEOPLE: NOT DIFFICULT AT ALL
SUM OF ALL RESPONSES TO PHQ QUESTIONS 1-9: 0
SUM OF ALL RESPONSES TO PHQ QUESTIONS 1-9: 0

## 2022-10-19 NOTE — TELEPHONE ENCOUNTER
Ned Power is a 47 year old male presents to the Urgent Care clinic today with complaints of right sided dental pain for 3 days. Pt has tried Ibuprofen, mouth wash for sx with little relief.      Pt states he has not seen a dentist  Pt believes he might have an infection, states he has \"poor dental care\"    Patient masked during visit. LPN wearing mask, gloves and eye protection during visit.    Patient would like communication of their results via:    [employer]       Patient called in asking for help scheduling urgent care virtual visit and asking what he needs to do in the meantime. Writer noted patient was able to communicate appropriately but did see patient usually prefers  - patient states he would like an  on the phone during our conversation. Rest of conversation with patient was through [facility] . Attempted to help walk patient through scheduling urgent care virtual visit but patient continued having issues with his MyChart. Patient was transferred to scheduling line where they were able to help him schedule this visit. Reiterated to patient importance of quarantining during this time due to positive TB gold lab test. No further questions or concerns.      Becky Smith, LORENZON, RN  [facility]

## 2022-12-09 ENCOUNTER — OFFICE VISIT (OUTPATIENT)
Dept: INFECTIOUS DISEASES | Facility: CLINIC | Age: 67
End: 2022-12-09
Attending: FAMILY MEDICINE
Payer: COMMERCIAL

## 2022-12-09 VITALS
WEIGHT: 168.7 LBS | SYSTOLIC BLOOD PRESSURE: 114 MMHG | BODY MASS INDEX: 26.02 KG/M2 | OXYGEN SATURATION: 99 % | HEART RATE: 52 BPM | DIASTOLIC BLOOD PRESSURE: 65 MMHG

## 2022-12-09 DIAGNOSIS — Z22.7 LATENT TUBERCULOSIS INFECTION: ICD-10-CM

## 2022-12-09 DIAGNOSIS — R04.2 BLOOD-TINGED SPUTUM: ICD-10-CM

## 2022-12-09 LAB
ALBUMIN SERPL-MCNC: 3.7 G/DL (ref 3.4–5)
ALP SERPL-CCNC: 77 U/L (ref 40–150)
ALT SERPL W P-5'-P-CCNC: 22 U/L (ref 0–70)
ANION GAP SERPL CALCULATED.3IONS-SCNC: 4 MMOL/L (ref 3–14)
AST SERPL W P-5'-P-CCNC: 18 U/L (ref 0–45)
BASOPHILS # BLD AUTO: 0 10E3/UL (ref 0–0.2)
BASOPHILS NFR BLD AUTO: 0 %
BILIRUB SERPL-MCNC: 0.6 MG/DL (ref 0.2–1.3)
BUN SERPL-MCNC: 16 MG/DL (ref 7–30)
CALCIUM SERPL-MCNC: 9.1 MG/DL (ref 8.5–10.1)
CHLORIDE BLD-SCNC: 105 MMOL/L (ref 94–109)
CO2 SERPL-SCNC: 30 MMOL/L (ref 20–32)
CREAT SERPL-MCNC: 0.98 MG/DL (ref 0.66–1.25)
DEPRECATED CALCIDIOL+CALCIFEROL SERPL-MC: 49 UG/L (ref 20–75)
EOSINOPHIL # BLD AUTO: 0 10E3/UL (ref 0–0.7)
EOSINOPHIL NFR BLD AUTO: 1 %
ERYTHROCYTE [DISTWIDTH] IN BLOOD BY AUTOMATED COUNT: 12.1 % (ref 10–15)
GFR SERPL CREATININE-BSD FRML MDRD: 85 ML/MIN/1.73M2
GLUCOSE BLD-MCNC: 112 MG/DL (ref 70–99)
HCT VFR BLD AUTO: 41.8 % (ref 40–53)
HGB BLD-MCNC: 14.2 G/DL (ref 13.3–17.7)
IMM GRANULOCYTES # BLD: 0 10E3/UL
IMM GRANULOCYTES NFR BLD: 0 %
LYMPHOCYTES # BLD AUTO: 1.4 10E3/UL (ref 0.8–5.3)
LYMPHOCYTES NFR BLD AUTO: 41 %
MCH RBC QN AUTO: 31.2 PG (ref 26.5–33)
MCHC RBC AUTO-ENTMCNC: 34 G/DL (ref 31.5–36.5)
MCV RBC AUTO: 92 FL (ref 78–100)
MONOCYTES # BLD AUTO: 0.5 10E3/UL (ref 0–1.3)
MONOCYTES NFR BLD AUTO: 13 %
NEUTROPHILS # BLD AUTO: 1.6 10E3/UL (ref 1.6–8.3)
NEUTROPHILS NFR BLD AUTO: 45 %
NRBC # BLD AUTO: 0 10E3/UL
NRBC BLD AUTO-RTO: 0 /100
PLATELET # BLD AUTO: 212 10E3/UL (ref 150–450)
POTASSIUM BLD-SCNC: 4 MMOL/L (ref 3.4–5.3)
PROT SERPL-MCNC: 7.5 G/DL (ref 6.8–8.8)
RBC # BLD AUTO: 4.55 10E6/UL (ref 4.4–5.9)
SODIUM SERPL-SCNC: 139 MMOL/L (ref 133–144)
WBC # BLD AUTO: 3.5 10E3/UL (ref 4–11)

## 2022-12-09 PROCEDURE — 82306 VITAMIN D 25 HYDROXY: CPT | Performed by: INTERNAL MEDICINE

## 2022-12-09 PROCEDURE — 99205 OFFICE O/P NEW HI 60 MIN: CPT | Performed by: INTERNAL MEDICINE

## 2022-12-09 PROCEDURE — 85025 COMPLETE CBC W/AUTO DIFF WBC: CPT | Performed by: INTERNAL MEDICINE

## 2022-12-09 PROCEDURE — 80053 COMPREHEN METABOLIC PANEL: CPT | Performed by: INTERNAL MEDICINE

## 2022-12-09 PROCEDURE — 36415 COLL VENOUS BLD VENIPUNCTURE: CPT | Performed by: INTERNAL MEDICINE

## 2022-12-09 RX ORDER — RIFAMPIN 300 MG/1
600 CAPSULE ORAL DAILY
Qty: 60 CAPSULE | Refills: 1 | Status: SHIPPED | OUTPATIENT
Start: 2022-12-09 | End: 2023-01-08

## 2022-12-09 NOTE — NURSING NOTE
"Prince Sheehan's goals for this visit include:   Chief Complaint   Patient presents with     Consult     Rule out latent TB infection. TB blood work completed on 10/10/22. Referred by Dr. Velazquez       PCP: [facility]    Referring Provider:  Primitivo Velazquez MD  [id number] [place] [address]      Initial /65 (BP Location: Left arm, Patient Position: Sitting, Cuff Size: Adult Regular)   Pulse 52   Wt 76.5 kg (168 lb 11.2 oz)   SpO2 99%   BMI 26.02 kg/m   Estimated body mass index is 26.02 kg/m  as calculated from the following:    Height as of 7/12/21: 1.715 m (5' 7.52\").    Weight as of this encounter: 76.5 kg (168 lb 11.2 oz).    Medication Reconciliation: complete    Do you need any medication refills at today's visit? none    LUCY Olmos  Rheumatology/Infectious disease  [facility]   [phone]    "

## 2022-12-09 NOTE — PROGRESS NOTES
INFECTIOUS DISEASES CONSULTATION  NOTE    Consult requested by: Primitivo Velazquez MD  Reason for Consult : Latent Tb   Date of Consult: 12/8/22    In[facility]     HISTORY OF PRESENT ILLNESS:                                                    Prince Sheehan is a 67 year old male, history of bilateral renal cysts, who presents to clinic today for the following health issues: Latent TB     Prince is here with a relative, Romero. About 2 months ago, he noticed upon awakening some blood thinged sputum for 5 days when he was clearing his throat. He felt better after that and had no recurrence of similar symptoms.  No obvious hemoptysis. No fever, chills, nightsweats, shortness of breath at that time. In fact, he was feeling well otherwise.  He went to seek medical care and CXR on 10/4/22 showed cardiomediastinal silhouette is within normal limits. Atherosclerotic vascular calcification of the aortic knob. Lateral left basilar pulmonary opacity, could represent atelectasis versus infection. No significant pleural effusion or pneumothorax. TB quantiferon was positive. He was started on Rifampin 600 mg po daily  on 10/10/22. He tolerated Rifampin well . He took it for 1 month and last dose was about 1 month ago.      [place] [facility] [place] ago. He had BCG vaccination in his childhood. He denies any contacts with anyone with TB.  He lives with this relative and his family ( 4 kids) . Prince feels well today. No fever, chills, nightsweats, weight loss. He has good appetite and good energy level. no chronic cough. no hemotysis . He works as a  and denies exposure to any chemical at work. he does not smoke or drink.        ROS:  CONSTITUTIONAL:NEGATIVE for fever, chills, change in weight  INTEGUMENTARY/SKIN: NEGATIVE for worrisome rashes, moles or lesions  EYES: NEGATIVE for vision changes or irritation  ENT/MOUTH: NEGATIVE for ear, mouth and throat problems  RESP:NEGATIVE for  significant cough or SOB  CV: NEGATIVE for chest pain, palpitations or peripheral edema  GI: NEGATIVE for nausea, abdominal pain, heartburn, or change in bowel habits  : NEGATIVE for urinary frequency, hematuria or dysuria  MUSCULOSKELETAL: NEGATIVE for significant arthralgias or myalgia  NEURO: NEGATIVE for weakness, dizziness or paresthesias  ENDOCRINE: NEGATIVE for temperature intolerance, skin/hair changes  HEME/ALLERGY/IMMUNE: NEGATIVE for bleeding problems  PSYCHIATRIC: NEGATIVE for changes in mood or affect    Problem list and histories reviewed & adjusted, as indicated.  Additional history: as documented    PAST MEDICAL HISTORY:  Latent TB     PAST SURGICAL HISTORY:  Patient  has no past surgical history on file.    CURRENT MEDICATIONS:  Current Outpatient Medications   Medication Sig Dispense Refill     rifampin (RIFADIN) 300 MG capsule Take 2 capsules (600 mg) by mouth daily 60 capsule 2     ALLERGY:  No Known Allergies    IMMUNIZATION HISTORY:  Immunization History   Administered Date(s) Administered     COVID-19 Vaccine 18+ (Moderna) 02/13/2021, 03/13/2021, 11/18/2021, 07/15/2022     Influenza Vaccine >6 months (Alfuria,Fluzone) 11/01/2017, 11/18/2021     MMR 11/01/2017     Tdap (Adacel,Boostrix) 11/01/2017     Twinrix A/B 11/01/2017, 12/01/2017, 05/02/2018     Varicella 11/01/2017     SOCIAL HISTORY:  Patient  reports that he has never smoked. He has never used smokeless tobacco. He reports that he does not currently use alcohol. He reports that he does not currently use drugs.  born and raised in [facility], came to [place] ago. Lives with a relative with 4 other children. works as a .     FAMILY HISTORY:  Patient's family history includes No Known Problems in his brother, father, mother, and sister.    Labs reviewed in EPIC    OBJECTIVE:                                                    /65 (BP Location: Left arm, Patient Position: Sitting, Cuff Size: Adult Regular)    Pulse 52   Wt 76.5 kg (168 lb 11.2 oz)   SpO2 99%   BMI 26.02 kg/m   Body mass index is 26.02 kg/m .   GENERAL: healthy, alert and no distress  EYES: Eyes grossly normal to inspection, PERRL and conjunctivae and sclerae normal  HENT: ear canals and TM's normal, nose and mouth without ulcers or lesions  NECK: no adenopathy, no asymmetry, masses, or scars and thyroid normal to palpation  RESP: lungs clear to auscultation - no rales, rhonchi or wheezes  CV: regular rate and rhythm, normal S1 S2, no S3 or S4, no murmur, click or rub, no peripheral edema and peripheral pulses strong  ABDOMEN: soft, nontender, no hepatosplenomegaly, no masses and bowel sounds normal  MS: no gross musculoskeletal defects noted, no edema  SKIN: no suspicious lesions or rashes  NEURO: Normal strength and tone, mentation intact and speech normal  Spine : no spinal tenderness  BACK: no CVA tenderness, no paralumbar tenderness  PSYCH: mentation appears normal, affect normal  LYMPH: no cervical, supraclavicular, axillary, or inguinal adenopathy       ASSESSMENT AND PLAN:                                                      1. Latent TB   - negative CXR on 10/4/22  - blood tinged sputum with throat clearing and not coughing per patient. x 5 days ( noticed upon awakening in the morning) , resolved on its own.   - started on Rifampin 600 mg po daily on 10/10/22 but patient took it for 1 month only. perhaps not knowing there was 2 refills.     2. Bilateral renal cysts    Plan/Recommendations  - clinically stable and no signs of active TB. Blood tinged sputum with throat clearing, probably related to injury of mucosal membrane/ dryness but clinical description not consistent with active TB   - labs: CMP, CBC diff, CRP , Vitamin D today  - monthly CMP, CBC diff  - will monitor clinically for now,  if he has any hemoptysis, he is advised to notify his doctors / go to ER and will need CT chest at that time. if recurs, he may need bronchoscopy    - if  labs are ok, will reorder Rifampin 600 mg po daily to complete 4 months of therapy. discussed with them indication of Rifampin , possible side effects of Rifampin.     video follow-up on 1/6/22 . Plan discussed with patient and Romero    Thank You for allowing me to participate in the care of this patient    Ramakrishna Olvera MD, M.Med.Sc  Division of Infectious Diseases and International Medicine  [facility]      Time spent with patient, chart review, documentation and care coordination : 90 min

## 2023-01-06 ENCOUNTER — VIRTUAL VISIT (OUTPATIENT)
Dept: INFECTIOUS DISEASES | Facility: CLINIC | Age: 68
End: 2023-01-06
Payer: COMMERCIAL

## 2023-01-06 ENCOUNTER — LAB (OUTPATIENT)
Dept: LAB | Facility: CLINIC | Age: 68
End: 2023-01-06
Payer: COMMERCIAL

## 2023-01-06 DIAGNOSIS — Z22.7 LATENT TUBERCULOSIS INFECTION: Primary | ICD-10-CM

## 2023-01-06 DIAGNOSIS — Z22.7 LATENT TUBERCULOSIS INFECTION: ICD-10-CM

## 2023-01-06 LAB
ALBUMIN SERPL-MCNC: 3.8 G/DL (ref 3.4–5)
ALP SERPL-CCNC: 90 U/L (ref 40–150)
ALT SERPL W P-5'-P-CCNC: 25 U/L (ref 0–70)
ANION GAP SERPL CALCULATED.3IONS-SCNC: 6 MMOL/L (ref 3–14)
AST SERPL W P-5'-P-CCNC: 23 U/L (ref 0–45)
BASOPHILS # BLD AUTO: 0 10E3/UL (ref 0–0.2)
BASOPHILS NFR BLD AUTO: 0 %
BILIRUB DIRECT SERPL-MCNC: 0.2 MG/DL (ref 0–0.2)
BILIRUB SERPL-MCNC: 0.6 MG/DL (ref 0.2–1.3)
BUN SERPL-MCNC: 15 MG/DL (ref 7–30)
CALCIUM SERPL-MCNC: 9.3 MG/DL (ref 8.5–10.1)
CHLORIDE BLD-SCNC: 104 MMOL/L (ref 94–109)
CO2 SERPL-SCNC: 29 MMOL/L (ref 20–32)
CREAT SERPL-MCNC: 0.86 MG/DL (ref 0.66–1.25)
EOSINOPHIL # BLD AUTO: 0 10E3/UL (ref 0–0.7)
EOSINOPHIL NFR BLD AUTO: 1 %
ERYTHROCYTE [DISTWIDTH] IN BLOOD BY AUTOMATED COUNT: 11.8 % (ref 10–15)
GFR SERPL CREATININE-BSD FRML MDRD: >90 ML/MIN/1.73M2
GLUCOSE BLD-MCNC: 130 MG/DL (ref 70–99)
HCT VFR BLD AUTO: 42.8 % (ref 40–53)
HGB BLD-MCNC: 14.5 G/DL (ref 13.3–17.7)
LYMPHOCYTES # BLD AUTO: 2.2 10E3/UL (ref 0.8–5.3)
LYMPHOCYTES NFR BLD AUTO: 49 %
MCH RBC QN AUTO: 31.5 PG (ref 26.5–33)
MCHC RBC AUTO-ENTMCNC: 33.9 G/DL (ref 31.5–36.5)
MCV RBC AUTO: 93 FL (ref 78–100)
MONOCYTES # BLD AUTO: 0.5 10E3/UL (ref 0–1.3)
MONOCYTES NFR BLD AUTO: 11 %
NEUTROPHILS # BLD AUTO: 1.7 10E3/UL (ref 1.6–8.3)
NEUTROPHILS NFR BLD AUTO: 39 %
PLATELET # BLD AUTO: 230 10E3/UL (ref 150–450)
POTASSIUM BLD-SCNC: 4.6 MMOL/L (ref 3.4–5.3)
PROT SERPL-MCNC: 7.4 G/DL (ref 6.8–8.8)
RBC # BLD AUTO: 4.61 10E6/UL (ref 4.4–5.9)
SODIUM SERPL-SCNC: 139 MMOL/L (ref 133–144)
WBC # BLD AUTO: 4.5 10E3/UL (ref 4–11)

## 2023-01-06 PROCEDURE — 36415 COLL VENOUS BLD VENIPUNCTURE: CPT

## 2023-01-06 PROCEDURE — 80053 COMPREHEN METABOLIC PANEL: CPT

## 2023-01-06 PROCEDURE — 99214 OFFICE O/P EST MOD 30 MIN: CPT | Mod: 95 | Performed by: INTERNAL MEDICINE

## 2023-01-06 PROCEDURE — 85025 COMPLETE CBC W/AUTO DIFF WBC: CPT

## 2023-01-06 PROCEDURE — 82248 BILIRUBIN DIRECT: CPT

## 2023-01-06 NOTE — PROGRESS NOTES
Prince Sheehan is a 67 year old who is being evaluated via a billable video visit.      How would you like to obtain your AVS? Mail a copy  If the video visit is dropped, the invitation should be resent by: Text to cell phone: [phone]  Will anyone else be joining your video visit? Yes: relative. How would they like to receive their invitation? Text to cell phone: [phone]  Are you still currently in the state of [place]? YES  If patient encounters technical issues they should call [phone]  During this virtual visit the patient is located in [place], patient verifies this as the location during the entirety of this visit.   Video-Visit Details  Video Start Time: 11.30 am  Video End Time: 11.36 am   Originating Location (pt. Location): Home  Distant Location (provider location):  [facility]  Platform used for Video Visit: [employer]     INFECTIOUS DISEASES PROGRESS NOTE    Consult requested by: Primitivo Velazquez MD  Reason for Consult : Latent Tb   Date of Consult: 12/8/22    Infectious Diseases Clinic     HISTORY OF PRESENT ILLNESS:                                                    Prince Sheehan is a 67 year old male, history of bilateral renal cysts, who presents to clinic today for the following health issues: Latent TB     Prince is here with a relative, Romero. About 2 months ago, he noticed upon awakening some blood thinged sputum for 5 days when he was clearing his throat. He felt better after that and had no recurrence of similar symptoms.  No obvious hemoptysis. No fever, chills, nightsweats, shortness of breath at that time. In fact, he was feeling well otherwise.  He went to seek medical care and CXR on 10/4/22 showed cardiomediastinal silhouette is within normal limits. Atherosclerotic vascular calcification of the aortic knob. Lateral left basilar pulmonary opacity, could represent atelectasis versus infection. No significant pleural effusion or pneumothorax. TB  quantiferon was positive. He was started on Rifampin 600 mg po daily  on 10/10/22. He tolerated Rifampin well . He took it for 1 month and last dose was about 1 month ago.      [place] from [place] about 7 years ago. He had BCG vaccination in his childhood. He denies any contacts with anyone with TB.  He lives with this relative and his family ( 4 kids) . Prince feels well today. No fever, chills, nightsweats, weight loss. He has good appetite and good energy level. no chronic cough. no hemotysis . He works as a  and denies exposure to any chemical at work. he does not smoke or drink.        ROS:  CONSTITUTIONAL:NEGATIVE for fever, chills, change in weight  INTEGUMENTARY/SKIN: NEGATIVE for worrisome rashes, moles or lesions  EYES: NEGATIVE for vision changes or irritation  ENT/MOUTH: NEGATIVE for ear, mouth and throat problems  RESP:NEGATIVE for significant cough or SOB  CV: NEGATIVE for chest pain, palpitations or peripheral edema  GI: NEGATIVE for nausea, abdominal pain, heartburn, or change in bowel habits  : NEGATIVE for urinary frequency, hematuria or dysuria  MUSCULOSKELETAL: NEGATIVE for significant arthralgias or myalgia  NEURO: NEGATIVE for weakness, dizziness or paresthesias  ENDOCRINE: NEGATIVE for temperature intolerance, skin/hair changes  HEME/ALLERGY/IMMUNE: NEGATIVE for bleeding problems  PSYCHIATRIC: NEGATIVE for changes in mood or affect    Problem list and histories reviewed & adjusted, as indicated.  Additional history: as documented    Video Follow-up on 1/6/23  Prince is doing well. No more cough or bloody sputum. no pain. tolerates Rifampin well. has good appetite with stable weight        PAST MEDICAL HISTORY:  Latent TB     PAST SURGICAL HISTORY:  Patient  has no past surgical history on file.    CURRENT MEDICATIONS:  Current Outpatient Medications   Medication Sig Dispense Refill     rifampin (RIFADIN) 300 MG capsule Take 2 capsules (600 mg) by mouth daily for 30  days 60 capsule 1     ALLERGY:  No Known Allergies    IMMUNIZATION HISTORY:  Immunization History   Administered Date(s) Administered     COVID-19 Vaccine 18+ (Moderna) 02/13/2021, 03/13/2021, 11/18/2021, 07/15/2022     Influenza Vaccine >6 months (Alfuria,Fluzone) 11/01/2017, 11/18/2021     MMR 11/01/2017     Tdap (Adacel,Boostrix) 11/01/2017     Twinrix A/B 11/01/2017, 12/01/2017, 05/02/2018     Varicella 11/01/2017     SOCIAL HISTORY:  Patient  reports that he has never smoked. He has never used smokeless tobacco. He reports that he does not currently use alcohol. He reports that he does not currently use drugs.  born and raised in [facility], [place]. Lives with a relative with 4 other children. works as a .     FAMILY HISTORY:  Patient's family history includes No Known Problems in his brother, father, mother, and sister.    Labs reviewed in EPIC    OBJECTIVE:                                                    There were no vitals taken for this visit. There is no height or weight on file to calculate BMI.   GENERAL: healthy, alert and no distress  EYES: Eyes grossly normal to inspection  NECK: supple  RESP: breathing comfortably on room air. no coughing   NEURO: mentation intact and speech normal  PSYCH: mentation appears normal, affect normal       ASSESSMENT AND PLAN:                                                      1. Latent TB   - negative CXR on 10/4/22  - blood tinged sputum with throat clearing and not coughing per patient. x 5 days ( noticed upon awakening in the morning) , resolved on its own.   - started on Rifampin 600 mg po daily on 10/10/22 but patient took it for 1 month only. perhaps not knowing there was 2 refills.  - restarted Rifampin on 12/9/22      2. Bilateral renal cysts    Plan/Recommendations  - clinically stable and no signs of active TB. no recurrence of chronic cough or bloody sputum.   - labs: CMP, CBC diff today , per request to be done at  [facility]   - if labs are ok, will reorder Rifampin 600 mg po daily to complete 4 months of therapy.    video follow-up on 2/3/23 at 9.30 am.    Plan discussed with patient and Romero Olvera MD, M.Med.Sc  Division of Infectious Diseases and International Medicine  [facility]      Time spent with patient, chart review, documentation and care coordination : 20 min

## 2023-01-08 DIAGNOSIS — Z22.7 LATENT TUBERCULOSIS INFECTION: ICD-10-CM

## 2023-01-08 RX ORDER — RIFAMPIN 300 MG/1
600 CAPSULE ORAL DAILY
Qty: 60 CAPSULE | Refills: 0 | Status: SHIPPED | OUTPATIENT
Start: 2023-01-08

## 2023-01-22 ENCOUNTER — HEALTH MAINTENANCE LETTER (OUTPATIENT)
Age: 68
End: 2023-01-22

## 2023-02-03 ENCOUNTER — VIRTUAL VISIT (OUTPATIENT)
Dept: INFECTIOUS DISEASES | Facility: CLINIC | Age: 68
End: 2023-02-03
Payer: COMMERCIAL

## 2023-02-03 DIAGNOSIS — Z22.7 LATENT TUBERCULOSIS INFECTION: Primary | ICD-10-CM

## 2023-02-03 PROCEDURE — 99213 OFFICE O/P EST LOW 20 MIN: CPT | Mod: 95 | Performed by: INTERNAL MEDICINE

## 2023-02-03 NOTE — PROGRESS NOTES
Prince Sheehan is a 67 year old who is being evaluated via a billable video visit.      How would you like to obtain your AVS? Mail a copy  If the video visit is dropped, the invitation should be resent by: Text to cell phone: [phone]  Will anyone else be joining your video visit? Yes: relative. How would they like to receive their invitation? Text to cell phone: [phone]  Are you still currently in the state of [place]? YES  If patient encounters technical issues they should call [phone]  During this virtual visit the patient is located in [place], patient verifies this as the location during the entirety of this visit.   Video-Visit Details  Video Start Time: 9.44 am  Video End Time:  9.50 am   Originating Location (pt. Location): Home  Distant Location (provider location):  [facility]  Platform used for Video Visit: [employer]     INFECTIOUS DISEASES PROGRESS NOTE    Consult requested by: Primitivo Velazquez MD  Reason for Consult : Latent Tb   Date of Consult: 12/8/22    Infectious Diseases Clinic     HISTORY OF PRESENT ILLNESS:                                                    Prince Sheehan is a 67 year old male, history of bilateral renal cysts, who presents to clinic today for the following health issues: Latent TB     Prince is here with a relative, Romero. About 2 months ago, he noticed upon awakening some blood thinged sputum for 5 days when he was clearing his throat. He felt better after that and had no recurrence of similar symptoms.  No obvious hemoptysis. No fever, chills, nightsweats, shortness of breath at that time. In fact, he was feeling well otherwise.  He went to seek medical care and CXR on 10/4/22 showed cardiomediastinal silhouette is within normal limits. Atherosclerotic vascular calcification of the aortic knob. Lateral left basilar pulmonary opacity, could represent atelectasis versus infection. No significant pleural effusion or pneumothorax. TB  quantiferon was positive. He was started on Rifampin 600 mg po daily  on 10/10/22. He tolerated Rifampin well . He took it for 1 month and last dose was about 1 month ago.      [place] [place] [facility] about 7 years ago. He had BCG vaccination in his childhood. He denies any contacts with anyone with TB.  He lives with this relative and his family ( 4 kids) . Prince feels well today. No fever, chills, nightsweats, weight loss. He has good appetite and good energy level. no chronic cough. no hemotysis . He works as a  and denies exposure to any chemical at work. he does not smoke or drink.        ROS:  CONSTITUTIONAL:NEGATIVE for fever, chills, change in weight  INTEGUMENTARY/SKIN: NEGATIVE for worrisome rashes, moles or lesions  EYES: NEGATIVE for vision changes or irritation  ENT/MOUTH: NEGATIVE for ear, mouth and throat problems  RESP:NEGATIVE for significant cough or SOB  CV: NEGATIVE for chest pain, palpitations or peripheral edema  GI: NEGATIVE for nausea, abdominal pain, heartburn, or change in bowel habits  : NEGATIVE for urinary frequency, hematuria or dysuria  MUSCULOSKELETAL: NEGATIVE for significant arthralgias or myalgia  NEURO: NEGATIVE for weakness, dizziness or paresthesias  ENDOCRINE: NEGATIVE for temperature intolerance, skin/hair changes  HEME/ALLERGY/IMMUNE: NEGATIVE for bleeding problems  PSYCHIATRIC: NEGATIVE for changes in mood or affect    Problem list and histories reviewed & adjusted, as indicated.  Additional history: as documented    Video Follow-up on 1/6/23  Prince is doing well. No more cough or bloody sputum. no pain. tolerates Rifampin well. has good appetite with stable weight      Video follow-up on 2/3/23  Prince is doing well. tolerates Rifampin well. no nausea/vomiting/diarrhea/cough/ hemoptysis and has good appetite     Discussed labs       PAST MEDICAL HISTORY:  Latent TB     PAST SURGICAL HISTORY:  Patient  has no past surgical history on file.    CURRENT  MEDICATIONS:  Current Outpatient Medications   Medication Sig Dispense Refill     rifampin (RIFADIN) 300 MG capsule Take 2 capsules (600 mg) by mouth daily 60 capsule 0     ALLERGY:  No Known Allergies    IMMUNIZATION HISTORY:  Immunization History   Administered Date(s) Administered     COVID-19 Vaccine 18+ (Moderna) 02/13/2021, 03/13/2021, 11/18/2021, 07/15/2022     Influenza Vaccine >6 months (Alfuria,Fluzone) 11/01/2017, 11/18/2021     MMR 11/01/2017     Tdap (Adacel,Boostrix) 11/01/2017     Twinrix A/B 11/01/2017, 12/01/2017, 05/02/2018     Varicella 11/01/2017     SOCIAL HISTORY:  Patient  reports that he has never smoked. He has never used smokeless tobacco. He reports that he does not currently use alcohol. He reports that he does not currently use drugs.  born and raised in [facility] [place], [place]. Lives with a relative with 4 other children. works as a .     FAMILY HISTORY:  Patient's family history includes No Known Problems in his brother, father, mother, and sister.    Labs reviewed in EPIC    OBJECTIVE:                                                    There were no vitals taken for this visit. There is no height or weight on file to calculate BMI.   GENERAL: healthy, alert and no distress  EYES: Eyes grossly normal to inspection  NECK: supple  RESP: breathing comfortably on room air. no coughing   NEURO: mentation intact and speech normal  PSYCH: mentation appears normal, affect normal       ASSESSMENT AND PLAN:                                                      1. Latent TB   - negative CXR on 10/4/22  - blood tinged sputum with throat clearing and not coughing per patient. x 5 days ( noticed upon awakening in the morning) , resolved on its own.   - started on Rifampin 600 mg po daily on 10/10/22 but patient took it for 1 month only. perhaps not knowing there was 2 refills.  - restarted Rifampin on 12/9/22      2. Bilateral renal cysts    Plan/Recommendations  -  clinically stable and no signs of active TB. no recurrence of chronic cough or bloody sputum.   - labs: CMP, CBC today , per request to be done at [facility]   - if labs are ok, will reorder Rifampin 600 mg po daily one more month to complete 4 months of therapy.     Plan discussed with patient and Romero Olvera MD, M.Med.Sc  Division of Infectious Diseases and International Medicine  [facility]      Time spent with patient, chart review, documentation and care coordination : 20 min

## 2023-02-09 ENCOUNTER — LAB (OUTPATIENT)
Dept: LAB | Facility: CLINIC | Age: 68
End: 2023-02-09
Payer: COMMERCIAL

## 2023-02-09 DIAGNOSIS — D72.819 LEUKOPENIA, UNSPECIFIED TYPE: ICD-10-CM

## 2023-02-09 DIAGNOSIS — Z22.7 LATENT TUBERCULOSIS INFECTION: ICD-10-CM

## 2023-02-09 DIAGNOSIS — Z22.7 LATENT TUBERCULOSIS INFECTION: Primary | ICD-10-CM

## 2023-02-09 LAB
ALBUMIN SERPL-MCNC: 3.3 G/DL (ref 3.4–5)
ALP SERPL-CCNC: 79 U/L (ref 40–150)
ALT SERPL W P-5'-P-CCNC: 22 U/L (ref 0–70)
ANION GAP SERPL CALCULATED.3IONS-SCNC: 2 MMOL/L (ref 3–14)
AST SERPL W P-5'-P-CCNC: 20 U/L (ref 0–45)
BILIRUB SERPL-MCNC: 0.4 MG/DL (ref 0.2–1.3)
BUN SERPL-MCNC: 11 MG/DL (ref 7–30)
CALCIUM SERPL-MCNC: 9.3 MG/DL (ref 8.5–10.1)
CHLORIDE BLD-SCNC: 108 MMOL/L (ref 94–109)
CO2 SERPL-SCNC: 30 MMOL/L (ref 20–32)
CREAT SERPL-MCNC: 1 MG/DL (ref 0.66–1.25)
ERYTHROCYTE [DISTWIDTH] IN BLOOD BY AUTOMATED COUNT: 11.9 % (ref 10–15)
GFR SERPL CREATININE-BSD FRML MDRD: 82 ML/MIN/1.73M2
GLUCOSE BLD-MCNC: 116 MG/DL (ref 70–99)
HCT VFR BLD AUTO: 42.3 % (ref 40–53)
HGB BLD-MCNC: 14.3 G/DL (ref 13.3–17.7)
MCH RBC QN AUTO: 31.4 PG (ref 26.5–33)
MCHC RBC AUTO-ENTMCNC: 33.8 G/DL (ref 31.5–36.5)
MCV RBC AUTO: 93 FL (ref 78–100)
PLATELET # BLD AUTO: 243 10E3/UL (ref 150–450)
POTASSIUM BLD-SCNC: 5 MMOL/L (ref 3.4–5.3)
PROT SERPL-MCNC: 6.5 G/DL (ref 6.8–8.8)
RBC # BLD AUTO: 4.55 10E6/UL (ref 4.4–5.9)
SODIUM SERPL-SCNC: 140 MMOL/L (ref 133–144)
WBC # BLD AUTO: 3.1 10E3/UL (ref 4–11)

## 2023-02-09 PROCEDURE — 36415 COLL VENOUS BLD VENIPUNCTURE: CPT

## 2023-02-09 PROCEDURE — 85027 COMPLETE CBC AUTOMATED: CPT

## 2023-02-09 PROCEDURE — 80053 COMPREHEN METABOLIC PANEL: CPT

## 2024-02-24 ENCOUNTER — HEALTH MAINTENANCE LETTER (OUTPATIENT)
Age: 69
End: 2024-02-24

## 2025-03-09 ENCOUNTER — HEALTH MAINTENANCE LETTER (OUTPATIENT)
Age: 70
End: 2025-03-09